# Patient Record
Sex: MALE | Race: WHITE | NOT HISPANIC OR LATINO | URBAN - METROPOLITAN AREA
[De-identification: names, ages, dates, MRNs, and addresses within clinical notes are randomized per-mention and may not be internally consistent; named-entity substitution may affect disease eponyms.]

---

## 2020-06-09 ENCOUNTER — COMMUNICATION - HEALTHEAST (OUTPATIENT)
Dept: FAMILY MEDICINE | Facility: CLINIC | Age: 25
End: 2020-06-09

## 2021-06-08 NOTE — TELEPHONE ENCOUNTER
New Appointment Needed  What is the reason for the visit:    New patent needs new patient physical with a provider any location. He is in the  and needs an inperson physical with a provider. Can you check with any [facility] to see if they would be willing to see him ASAP?  He needs his physical by 6/26. Thank you!  Provider Preference: Any, Any location  Waitlist offered?: N/A  Okay to leave a detailed message:  Yes

## 2021-06-08 NOTE — TELEPHONE ENCOUNTER
Called and lvm for patient to call back for appointment.    Okay to schedule with any provider per Dr. Kaminski

## 2021-06-08 NOTE — TELEPHONE ENCOUNTER
Called and lvm for patient to call back to schedule new patient physical.    Per Medical Director- okay to schedule this with any face to face provider. Add in appointment notes okay per Dr. Kaminski